# Patient Record
Sex: MALE | Race: WHITE | NOT HISPANIC OR LATINO | URBAN - METROPOLITAN AREA
[De-identification: names, ages, dates, MRNs, and addresses within clinical notes are randomized per-mention and may not be internally consistent; named-entity substitution may affect disease eponyms.]

---

## 2017-06-21 ENCOUNTER — IMPORTED ENCOUNTER (OUTPATIENT)
Dept: URBAN - METROPOLITAN AREA CLINIC 50 | Facility: CLINIC | Age: 57
End: 2017-06-21

## 2019-01-25 ENCOUNTER — IMPORTED ENCOUNTER (OUTPATIENT)
Dept: URBAN - METROPOLITAN AREA CLINIC 50 | Facility: CLINIC | Age: 59
End: 2019-01-25

## 2020-05-27 ENCOUNTER — IMPORTED ENCOUNTER (OUTPATIENT)
Dept: URBAN - METROPOLITAN AREA CLINIC 50 | Facility: CLINIC | Age: 60
End: 2020-05-27

## 2021-04-18 ASSESSMENT — VISUAL ACUITY
OS_CC: 20/25
OD_CC: 20/40
OD_BAT: >20/400
OS_BAT: >20/400
OS_CC: J1+
OS_CC: 20/40
OS_CC: J2
OS_OTHER: >20/400.
OD_CC: J2
OD_CC: J1+
OD_CC: 20/25
OD_OTHER: >20/400.

## 2021-04-18 ASSESSMENT — TONOMETRY
OD_IOP_MMHG: 24
OD_IOP_MMHG: 22
OS_IOP_MMHG: 22
OS_IOP_MMHG: 24

## 2021-08-05 ENCOUNTER — PREPPED CHART (OUTPATIENT)
Dept: URBAN - METROPOLITAN AREA CLINIC 50 | Facility: CLINIC | Age: 61
End: 2021-08-05

## 2021-08-17 ENCOUNTER — COMPREHENSIVE EXAM (OUTPATIENT)
Dept: URBAN - METROPOLITAN AREA CLINIC 50 | Facility: CLINIC | Age: 61
End: 2021-08-17

## 2021-08-17 DIAGNOSIS — H25.813: ICD-10-CM

## 2021-08-17 DIAGNOSIS — H40.053: ICD-10-CM

## 2021-08-17 DIAGNOSIS — E11.9: ICD-10-CM

## 2021-08-17 PROCEDURE — 92134 CPTRZ OPH DX IMG PST SGM RTA: CPT

## 2021-08-17 PROCEDURE — 92014 COMPRE OPH EXAM EST PT 1/>: CPT

## 2021-08-17 ASSESSMENT — KERATOMETRY
OD_K2POWER_DIOPTERS: 45.75
OS_K2POWER_DIOPTERS: 45.50
OS_AXISANGLE2_DEGREES: 53
OD_K1POWER_DIOPTERS: 44.25
OD_AXISANGLE2_DEGREES: 89
OS_AXISANGLE_DEGREES: 143
OD_AXISANGLE_DEGREES: 179
OS_K1POWER_DIOPTERS: 45.00

## 2021-08-17 ASSESSMENT — TONOMETRY
OD_IOP_MMHG: 22
OS_IOP_MMHG: 21

## 2021-08-17 ASSESSMENT — VISUAL ACUITY
OS_CC: 20/100
OD_CC: 20/200
OU_SC: J2
OU_CC: 20/100
OS_PH: 20/70
OD_PH: 20/70

## 2021-08-25 ENCOUNTER — BIOMETRY (OUTPATIENT)
Dept: URBAN - METROPOLITAN AREA CLINIC 52 | Facility: CLINIC | Age: 61
End: 2021-08-25

## 2021-08-25 DIAGNOSIS — H25.813: ICD-10-CM

## 2021-08-25 PROCEDURE — 92136 OPHTHALMIC BIOMETRY: CPT

## 2021-08-25 PROCEDURE — TOPOIOL CORNEAL TOPOGRAPHY-PREMIUM IOL

## 2021-08-25 ASSESSMENT — KERATOMETRY
OD_K1POWER_DIOPTERS: 44.75
OD_AXISANGLE2_DEGREES: 007
OS_K2POWER_DIOPTERS: 44.75
OD_AXISANGLE_DEGREES: 97
OS_AXISANGLE2_DEGREES: 163
OD_K2POWER_DIOPTERS: 44.25
OS_K1POWER_DIOPTERS: 45.12
OS_AXISANGLE_DEGREES: 73

## 2021-08-31 ENCOUNTER — PRE OP - CE/IOL OD (OUTPATIENT)
Dept: URBAN - METROPOLITAN AREA CLINIC 50 | Facility: CLINIC | Age: 61
End: 2021-08-31

## 2021-08-31 DIAGNOSIS — H25.811: ICD-10-CM

## 2021-08-31 PROCEDURE — PREOP PRE OP VISIT

## 2021-08-31 ASSESSMENT — TONOMETRY
OS_IOP_MMHG: 17
OD_IOP_MMHG: 18

## 2021-08-31 ASSESSMENT — VISUAL ACUITY
OD_CC: 20/200
OS_CC: 20/100
OS_PH: 20/70
OD_PH: 20/70

## 2021-08-31 ASSESSMENT — KERATOMETRY
OS_AXISANGLE2_DEGREES: 163
OD_AXISANGLE_DEGREES: 97
OS_AXISANGLE_DEGREES: 73
OS_K2POWER_DIOPTERS: 44.75
OD_K2POWER_DIOPTERS: 44.25
OS_K1POWER_DIOPTERS: 45.12
OD_K1POWER_DIOPTERS: 44.75
OD_AXISANGLE2_DEGREES: 007

## 2021-08-31 NOTE — PATIENT DISCUSSION
CATARACT SURGERY PLANNER - MULTIFOCAL IOL/+FEMTO: Phacoemulsification with IOL: Eye: right|DOS: 9/8/21|Model: DFR00V|Power: 11.5(ORA)|Target: MF|Femto: yes|Arcs: 29 @ 105 ; 29 @ 285|Visc: duet|Omidria: yes|10% Phenylephrine: no|Epi-shugarcaine: yes|Phaco Setting: dense|BSS+: no|Trypan Blue: no|CTR: no|Olive Tip: no|Atropine: no|Pupilloplasty: no|Notes: MF info given.

## 2021-09-08 ENCOUNTER — SAME DAY PO - CE/IOL (OUTPATIENT)
Dept: URBAN - METROPOLITAN AREA CLINIC 52 | Facility: CLINIC | Age: 61
End: 2021-09-08

## 2021-09-08 ENCOUNTER — SURGERY/PROCEDURE (OUTPATIENT)
Dept: URBAN - METROPOLITAN AREA SURGERY 16 | Facility: SURGERY | Age: 61
End: 2021-09-08

## 2021-09-08 DIAGNOSIS — Z96.1: ICD-10-CM

## 2021-09-08 DIAGNOSIS — Z98.41: ICD-10-CM

## 2021-09-08 DIAGNOSIS — H25.811: ICD-10-CM

## 2021-09-08 PROCEDURE — DFR00VFEMT SYNERGY IOL WITH FEMTO

## 2021-09-08 PROCEDURE — 99024 POSTOP FOLLOW-UP VISIT: CPT

## 2021-09-08 PROCEDURE — 66984 XCAPSL CTRC RMVL W/O ECP: CPT

## 2021-09-08 ASSESSMENT — KERATOMETRY
OD_AXISANGLE2_DEGREES: 007
OD_K1POWER_DIOPTERS: 44.75
OS_AXISANGLE_DEGREES: 73
OS_K2POWER_DIOPTERS: 44.75
OS_K1POWER_DIOPTERS: 45.12
OD_AXISANGLE_DEGREES: 97
OS_K2POWER_DIOPTERS: 44.75
OS_AXISANGLE2_DEGREES: 163
OS_AXISANGLE2_DEGREES: 163
OD_K1POWER_DIOPTERS: 44.75
OS_K1POWER_DIOPTERS: 45.12
OD_AXISANGLE_DEGREES: 97
OD_AXISANGLE2_DEGREES: 007
OD_K2POWER_DIOPTERS: 44.25
OS_AXISANGLE_DEGREES: 73
OD_K2POWER_DIOPTERS: 44.25

## 2021-09-08 ASSESSMENT — TONOMETRY
OD_IOP_MMHG: 15
OD_IOP_MMHG: 30

## 2021-09-08 ASSESSMENT — VISUAL ACUITY: OD_SC: 20/70

## 2021-09-08 NOTE — PATIENT DISCUSSION
s/p PO # 1 Cataract extraction with DFR00V 11.0 (ORA) IOL, doing well. Continue with post op drops as directed, see instruction sheet provided to the patient.

## 2021-09-14 ENCOUNTER — PRE OP - CE/IOL OS / 1 WEEK PO OD (OUTPATIENT)
Dept: URBAN - METROPOLITAN AREA CLINIC 50 | Facility: CLINIC | Age: 61
End: 2021-09-14

## 2021-09-14 DIAGNOSIS — H25.812: ICD-10-CM

## 2021-09-14 PROCEDURE — 92136 - 2N OPHTHALMIC BIOMETRY BY PARTIAL COHERENCE INTERFEROMETRY WITH INTRAOCULAR LENS POWER CALCULATION

## 2021-09-14 PROCEDURE — PREOP PRE OP VISIT

## 2021-09-14 ASSESSMENT — TONOMETRY
OS_IOP_MMHG: 18
OD_IOP_MMHG: 19

## 2021-09-14 ASSESSMENT — VISUAL ACUITY
OD_SC: J1+
OS_PH: 20/200
OD_SC: 20/20
OD_SC: 20/20

## 2021-09-14 ASSESSMENT — KERATOMETRY
OD_K2POWER_DIOPTERS: 44.25
OS_K1POWER_DIOPTERS: 45.12
OD_AXISANGLE2_DEGREES: 007
OS_AXISANGLE2_DEGREES: 163
OS_AXISANGLE_DEGREES: 73
OS_K2POWER_DIOPTERS: 44.75
OD_AXISANGLE_DEGREES: 97
OD_K1POWER_DIOPTERS: 44.75

## 2021-09-14 NOTE — PATIENT DISCUSSION
CATARACT SURGERY PLANNER - MULTIFOCAL IOL/+FEMTO: Phacoemulsification with IOL: Eye: left|DOS: 9/22/21|Model: DFR00V|Power: 10|Target: MF|Femto: yes|Arcs: 30 @ 60 ; 30 @ 240|Visc: duet|Omidria: yes|10% Phenylephrine: no|Epi-shugarcaine: yes|Phaco Setting: dense|BSS+: no|Trypan Blue: no|CTR: no|Olive Tip: no|Atropine: no|Pupilloplasty: no|Notes: no.

## 2021-09-22 ENCOUNTER — SAME DAY PO - CE/IOL (OUTPATIENT)
Dept: URBAN - METROPOLITAN AREA CLINIC 52 | Facility: CLINIC | Age: 61
End: 2021-09-22

## 2021-09-22 ENCOUNTER — SURGERY/PROCEDURE (OUTPATIENT)
Dept: URBAN - METROPOLITAN AREA SURGERY 16 | Facility: SURGERY | Age: 61
End: 2021-09-22

## 2021-09-22 DIAGNOSIS — Z98.42: ICD-10-CM

## 2021-09-22 DIAGNOSIS — Z96.1: ICD-10-CM

## 2021-09-22 DIAGNOSIS — H25.812: ICD-10-CM

## 2021-09-22 PROCEDURE — DFR00VFEMT SYNERGY IOL WITH FEMTO

## 2021-09-22 PROCEDURE — 99024 POSTOP FOLLOW-UP VISIT: CPT

## 2021-09-22 PROCEDURE — 66984 XCAPSL CTRC RMVL W/O ECP: CPT

## 2021-09-22 ASSESSMENT — KERATOMETRY
OS_AXISANGLE_DEGREES: 73
OS_AXISANGLE2_DEGREES: 163
OD_K2POWER_DIOPTERS: 44.25
OD_AXISANGLE2_DEGREES: 007
OD_AXISANGLE2_DEGREES: 007
OD_K1POWER_DIOPTERS: 44.75
OD_K2POWER_DIOPTERS: 44.25
OS_K1POWER_DIOPTERS: 45.12
OD_K1POWER_DIOPTERS: 44.75
OS_AXISANGLE_DEGREES: 73
OD_AXISANGLE_DEGREES: 97
OS_AXISANGLE2_DEGREES: 163
OS_K2POWER_DIOPTERS: 44.75
OS_K1POWER_DIOPTERS: 45.12
OS_K2POWER_DIOPTERS: 44.75
OD_AXISANGLE_DEGREES: 97

## 2021-09-22 ASSESSMENT — TONOMETRY
OS_IOP_MMHG: 16
OS_IOP_MMHG: 20

## 2021-09-22 ASSESSMENT — VISUAL ACUITY: OS_SC: 20/200

## 2021-09-30 ENCOUNTER — 1 WEEK PO - CE/IOL (OUTPATIENT)
Dept: URBAN - METROPOLITAN AREA CLINIC 50 | Facility: CLINIC | Age: 61
End: 2021-09-30

## 2021-09-30 DIAGNOSIS — Z96.1: ICD-10-CM

## 2021-09-30 PROCEDURE — 92134 CPTRZ OPH DX IMG PST SGM RTA: CPT

## 2021-09-30 PROCEDURE — 99024 POSTOP FOLLOW-UP VISIT: CPT

## 2021-09-30 ASSESSMENT — KERATOMETRY
OD_AXISANGLE_DEGREES: 97
OS_K2POWER_DIOPTERS: 44.75
OS_AXISANGLE_DEGREES: 73
OS_AXISANGLE2_DEGREES: 163
OD_AXISANGLE2_DEGREES: 007
OS_K1POWER_DIOPTERS: 45.12
OD_K2POWER_DIOPTERS: 44.25
OD_K1POWER_DIOPTERS: 44.75

## 2021-09-30 ASSESSMENT — TONOMETRY
OS_IOP_MMHG: 18
OD_IOP_MMHG: 18

## 2021-09-30 ASSESSMENT — VISUAL ACUITY
OS_SC: J1
OD_SC: 20/20
OS_SC: J1
OS_SC: 20/40

## 2021-09-30 NOTE — PATIENT DISCUSSION
The patient was found to be doing well postoperatively and was advised on drop instruction for steroid/nsaid/abx and home care. PO instructions reviewed with patient.

## 2021-12-16 ENCOUNTER — COMPREHENSIVE EXAM (OUTPATIENT)
Dept: URBAN - METROPOLITAN AREA CLINIC 52 | Facility: CLINIC | Age: 61
End: 2021-12-16

## 2021-12-16 DIAGNOSIS — Z96.1: ICD-10-CM

## 2021-12-16 PROCEDURE — 99024 POSTOP FOLLOW-UP VISIT: CPT

## 2021-12-16 ASSESSMENT — TONOMETRY
OS_IOP_MMHG: 17
OD_IOP_MMHG: 17

## 2021-12-16 ASSESSMENT — VISUAL ACUITY
OD_GLARE: 20/100
OS_GLARE: 20/400
OD_GLARE: 20/50-2
OD_SC: 20/25
OS_SC: 20/25
OU_SC: 20/20-3
OU_SC: J1+@16"
OS_GLARE: 20/50-2

## 2021-12-16 ASSESSMENT — KERATOMETRY
OD_K1POWER_DIOPTERS: 44.75
OS_K2POWER_DIOPTERS: 44.75
OD_AXISANGLE_DEGREES: 97
OD_K2POWER_DIOPTERS: 44.25
OS_AXISANGLE_DEGREES: 73
OS_AXISANGLE2_DEGREES: 163
OS_K1POWER_DIOPTERS: 45.12
OD_AXISANGLE2_DEGREES: 007

## 2023-04-19 ENCOUNTER — PREPPED CHART (OUTPATIENT)
Dept: URBAN - METROPOLITAN AREA CLINIC 50 | Facility: CLINIC | Age: 63
End: 2023-04-19

## 2023-11-06 ENCOUNTER — COMPREHENSIVE EXAM (OUTPATIENT)
Dept: URBAN - METROPOLITAN AREA CLINIC 52 | Facility: CLINIC | Age: 63
End: 2023-11-06

## 2023-11-06 DIAGNOSIS — H26.493: ICD-10-CM

## 2023-11-06 DIAGNOSIS — H01.001: ICD-10-CM

## 2023-11-06 DIAGNOSIS — Z79.4: ICD-10-CM

## 2023-11-06 DIAGNOSIS — H04.123: ICD-10-CM

## 2023-11-06 DIAGNOSIS — H01.004: ICD-10-CM

## 2023-11-06 DIAGNOSIS — E11.9: ICD-10-CM

## 2023-11-06 DIAGNOSIS — Z01.01: ICD-10-CM

## 2023-11-06 PROCEDURE — 92015 DETERMINE REFRACTIVE STATE: CPT

## 2023-11-06 PROCEDURE — 92014 COMPRE OPH EXAM EST PT 1/>: CPT

## 2023-11-06 ASSESSMENT — VISUAL ACUITY
OD_GLARE: 20/25
OS_PH: 20/30-1
OS_SC: 20/40
OS_GLARE: 20/30
OD_GLARE: 20/25
OU_SC: J3
OS_GLARE: 20/25
OD_PH: 20/30-1
OD_SC: 20/40

## 2023-11-06 ASSESSMENT — TONOMETRY
OS_IOP_MMHG: 16
OD_IOP_MMHG: 16

## 2024-12-12 NOTE — PATIENT DISCUSSION
DM NORMAL: No signs of diabetic retinopathy or diabetic macular edema on exam. Continue blood sugar and blood pressure control; consider exercise program as directed by PCP. We will re-evaluate in one year or sooner as needed. Letter to PCP. ---